# Patient Record
Sex: MALE | Race: BLACK OR AFRICAN AMERICAN | URBAN - METROPOLITAN AREA
[De-identification: names, ages, dates, MRNs, and addresses within clinical notes are randomized per-mention and may not be internally consistent; named-entity substitution may affect disease eponyms.]

---

## 2021-12-21 ENCOUNTER — LAB ENCOUNTER (OUTPATIENT)
Dept: LAB | Age: 41
End: 2021-12-21
Attending: FAMILY MEDICINE
Payer: COMMERCIAL

## 2021-12-21 ENCOUNTER — OFFICE VISIT (OUTPATIENT)
Dept: FAMILY MEDICINE CLINIC | Facility: CLINIC | Age: 41
End: 2021-12-21
Payer: COMMERCIAL

## 2021-12-21 VITALS
DIASTOLIC BLOOD PRESSURE: 88 MMHG | BODY MASS INDEX: 29.86 KG/M2 | HEIGHT: 75 IN | SYSTOLIC BLOOD PRESSURE: 122 MMHG | WEIGHT: 240.19 LBS | HEART RATE: 100 BPM

## 2021-12-21 DIAGNOSIS — Z00.00 ROUTINE PHYSICAL EXAMINATION: ICD-10-CM

## 2021-12-21 DIAGNOSIS — Z12.11 ENCOUNTER FOR COLONOSCOPY IN PATIENT WITH FAMILY HISTORY OF COLON POLYPS: Primary | ICD-10-CM

## 2021-12-21 DIAGNOSIS — Z76.89 ESTABLISHING CARE WITH NEW DOCTOR, ENCOUNTER FOR: ICD-10-CM

## 2021-12-21 DIAGNOSIS — Z83.71 ENCOUNTER FOR COLONOSCOPY IN PATIENT WITH FAMILY HISTORY OF COLON POLYPS: Primary | ICD-10-CM

## 2021-12-21 PROCEDURE — 3079F DIAST BP 80-89 MM HG: CPT | Performed by: FAMILY MEDICINE

## 2021-12-21 PROCEDURE — 93000 ELECTROCARDIOGRAM COMPLETE: CPT | Performed by: FAMILY MEDICINE

## 2021-12-21 PROCEDURE — 3008F BODY MASS INDEX DOCD: CPT | Performed by: FAMILY MEDICINE

## 2021-12-21 PROCEDURE — 3074F SYST BP LT 130 MM HG: CPT | Performed by: FAMILY MEDICINE

## 2021-12-21 PROCEDURE — 99386 PREV VISIT NEW AGE 40-64: CPT | Performed by: FAMILY MEDICINE

## 2021-12-21 RX ORDER — CYCLOBENZAPRINE HCL 10 MG
TABLET ORAL
COMMUNITY
Start: 2021-12-05

## 2021-12-21 RX ORDER — IBUPROFEN 800 MG/1
TABLET ORAL
COMMUNITY
Start: 2021-12-05

## 2021-12-21 NOTE — PATIENT INSTRUCTIONS
All adult screening ordered and done appropriate for patient's age and gender and risk factors and complaints. Encouraged physical fitness and daily physical activity daily. Monitor blood pressures and record at home. Limit salt intake.   Patient referred

## 2021-12-21 NOTE — PROGRESS NOTES
Subjective:   Patient ID: Jeffrey Valdivia is a 35year old male.     35year old AA male here for complete preventive care physical and for status update on any confirmed chronic medical illnesses and follow up on any previous labs or procedures that were sug normal.         Assessment & Plan:   1. Establishing care with new doctor, encounter for  Care established.     2. Routine physical examination  General well exam and the following have been ordered;  - CBC WITH DIFFERENTIAL WITH PLATELET  - COMP METABOLIC

## 2021-12-24 DIAGNOSIS — R17 ELEVATED BILIRUBIN: Primary | ICD-10-CM

## 2023-01-09 ENCOUNTER — LAB ENCOUNTER (OUTPATIENT)
Dept: LAB | Age: 43
End: 2023-01-09
Attending: FAMILY MEDICINE
Payer: COMMERCIAL

## 2023-01-09 ENCOUNTER — OFFICE VISIT (OUTPATIENT)
Dept: FAMILY MEDICINE CLINIC | Facility: CLINIC | Age: 43
End: 2023-01-09
Payer: COMMERCIAL

## 2023-01-09 VITALS
HEART RATE: 96 BPM | HEIGHT: 75 IN | WEIGHT: 248 LBS | DIASTOLIC BLOOD PRESSURE: 101 MMHG | TEMPERATURE: 97 F | SYSTOLIC BLOOD PRESSURE: 139 MMHG | BODY MASS INDEX: 30.84 KG/M2

## 2023-01-09 DIAGNOSIS — R73.09 ELEVATED RANDOM BLOOD GLUCOSE LEVEL: ICD-10-CM

## 2023-01-09 DIAGNOSIS — R17 ELEVATED BILIRUBIN: Primary | ICD-10-CM

## 2023-01-09 DIAGNOSIS — Z00.00 ENCOUNTER FOR SCREENING AND PREVENTATIVE CARE: ICD-10-CM

## 2023-01-09 PROCEDURE — 3008F BODY MASS INDEX DOCD: CPT | Performed by: FAMILY MEDICINE

## 2023-01-09 PROCEDURE — 3075F SYST BP GE 130 - 139MM HG: CPT | Performed by: FAMILY MEDICINE

## 2023-01-09 PROCEDURE — 3080F DIAST BP >= 90 MM HG: CPT | Performed by: FAMILY MEDICINE

## 2023-01-09 PROCEDURE — 99396 PREV VISIT EST AGE 40-64: CPT | Performed by: FAMILY MEDICINE

## 2023-01-09 NOTE — PATIENT INSTRUCTIONS
All adult screening ordered and done appropriate for patient's age and gender and risk factors and complaints. Monitor blood pressures and record at home. Limit salt intake. Encouraged physical fitness and daily physical activity daily. Recommend weight loss via daily exercising and consistent healthy dietary changes. Liver ultrasound ordered. Blood pressure does not measure to goal by criteria. We will await the lab results prior to the possibility of initiating blood pressure medication. Vitamin D3 at least 2000 IU gelcaps orally daily recommended. 1000 mg of vitamin C orally daily. Anywhere from 50 to 100 mg of zinc orally daily. We have recommended Culturelle probiotics for the patient to take regarding immune health and better gastrointestinal health and motility.

## 2023-01-11 DIAGNOSIS — R73.09 ELEVATED RANDOM BLOOD GLUCOSE LEVEL: Primary | ICD-10-CM

## 2023-01-13 LAB
ABSOLUTE BASOPHILS: 92 CELLS/UL (ref 0–200)
ABSOLUTE EOSINOPHILS: 348 CELLS/UL (ref 15–500)
ABSOLUTE LYMPHOCYTES: 1882 CELLS/UL (ref 850–3900)
ABSOLUTE MONOCYTES: 618 CELLS/UL (ref 200–950)
ABSOLUTE NEUTROPHILS: 4161 CELLS/UL (ref 1500–7800)
ALBUMIN/GLOBULIN RATIO: 1.6 (CALC) (ref 1–2.5)
ALBUMIN: 4.7 G/DL (ref 3.6–5.1)
ALKALINE PHOSPHATASE: 77 U/L (ref 36–130)
ALT: 21 U/L (ref 9–46)
APPEARANCE: CLEAR
AST: 24 U/L (ref 10–40)
BASOPHILS: 1.3 %
BILIRUBIN, TOTAL: 1.4 MG/DL (ref 0.2–1.2)
BILIRUBIN: NEGATIVE
BUN: 8 MG/DL (ref 7–25)
CALCIUM: 9.4 MG/DL (ref 8.6–10.3)
CARBON DIOXIDE: 23 MMOL/L (ref 20–32)
CHLORIDE: 101 MMOL/L (ref 98–110)
CHOL/HDLC RATIO: 2.8 (CALC)
CHOLESTEROL, TOTAL: 216 MG/DL
CREATININE: 0.95 MG/DL (ref 0.6–1.29)
EGFR: 101 ML/MIN/1.73M2
EOSINOPHILS: 4.9 %
GLOBULIN: 2.9 G/DL (CALC) (ref 1.9–3.7)
GLUCOSE: 107 MG/DL (ref 65–99)
GLUCOSE: NEGATIVE
HDL CHOLESTEROL: 77 MG/DL
HEMATOCRIT: 48.4 % (ref 38.5–50)
HEMOGLOBIN A1C: 5.8 % OF TOTAL HGB
HEMOGLOBIN: 16.2 G/DL (ref 13.2–17.1)
LDL-CHOLESTEROL: 118 MG/DL (CALC)
LEUKOCYTE ESTERASE: NEGATIVE
LYMPHOCYTES: 26.5 %
MCH: 29.1 PG (ref 27–33)
MCHC: 33.5 G/DL (ref 32–36)
MCV: 87.1 FL (ref 80–100)
MONOCYTES: 8.7 %
MPV: 8.6 FL (ref 7.5–12.5)
NEUTROPHILS: 58.6 %
NITRITE: NEGATIVE
NON-HDL CHOLESTEROL: 139 MG/DL (CALC)
OCCULT BLOOD: NEGATIVE
PH: 6 (ref 5–8)
PLATELET COUNT: 251 THOUSAND/UL (ref 140–400)
POTASSIUM: 4.1 MMOL/L (ref 3.5–5.3)
PROTEIN, TOTAL: 7.6 G/DL (ref 6.1–8.1)
RDW: 13.6 % (ref 11–15)
RED BLOOD CELL COUNT: 5.56 MILLION/UL (ref 4.2–5.8)
SODIUM: 137 MMOL/L (ref 135–146)
SPECIFIC GRAVITY: 1.02 (ref 1–1.03)
TOTAL PSA: 0.8 NG/ML
TRIGLYCERIDES: 107 MG/DL
TSH W/REFLEX TO FT4: 2.24 MIU/L (ref 0.4–4.5)
WHITE BLOOD CELL COUNT: 7.1 THOUSAND/UL (ref 3.8–10.8)

## 2023-01-16 RX ORDER — IBUPROFEN 800 MG/1
800 TABLET ORAL EVERY 8 HOURS PRN
Qty: 90 TABLET | Refills: 0 | Status: SHIPPED | OUTPATIENT
Start: 2023-01-16

## 2023-01-16 RX ORDER — CYCLOBENZAPRINE HCL 10 MG
10 TABLET ORAL NIGHTLY
Qty: 30 TABLET | Refills: 1 | Status: SHIPPED | OUTPATIENT
Start: 2023-01-16

## 2023-01-16 RX ORDER — CYCLOBENZAPRINE HCL 10 MG
10 TABLET ORAL NIGHTLY
Qty: 30 TABLET | Refills: 0 | Status: SHIPPED | OUTPATIENT
Start: 2023-01-16

## 2023-01-16 NOTE — TELEPHONE ENCOUNTER
Protocol failed or has No Protocol, please review  Both meds are listed as patient reported   Requested Prescriptions   Pending Prescriptions Disp Refills    cyclobenzaprine 10 MG Oral Tab  0       There is no refill protocol information for this order       ibuprofen 800 MG Oral Tab  0       Non-Narcotic Pain Medication Protocol Passed - 1/12/2023  7:50 PM        Passed - In person appointment or virtual visit in the past 6 mos or appointment in next 3 mos     Recent Outpatient Visits              1 week ago Elevated bilirubin    [address], [facility], [address], [place]    Office Visit    1 year ago Encounter for colonoscopy in patient with family history of colon [address] [facility], [address], [place]    Office Visit                           Recent Outpatient Visits              1 week ago Elevated bilirubin    [address] [facility], [address],     Office Visit    1 year ago Encounter for colonoscopy in patient with family history of colon [address] [facility], [address], [place]    Office Visit

## 2023-01-16 NOTE — TELEPHONE ENCOUNTER
/Protocol failed or has No Protocol, please review  Both meds are listed as patient reported     Last office visit 1/9/2023    Requested Prescriptions   Pending Prescriptions Disp Refills    cyclobenzaprine 10 MG Oral Tab  0       There is no refill protocol information for this order       ibuprofen 800 MG Oral Tab  0       Non-Narcotic Pain Medication Protocol Passed - 1/13/2023  2:22 PM        Passed - In person appointment or virtual visit in the past 6 mos or appointment in next 3 mos     Recent Outpatient Visits              1 week ago Elevated bilirubin    [address], [facility], [place], [place]    Office Visit    1 year ago Encounter for colonoscopy in patient with family history of colon [address] [facility], [place]    Office Visit                           Recent Outpatient Visits              1 week ago Elevated bilirubin    [address] [facility], [place]    Office Visit    1 year ago Encounter for colonoscopy in patient with family history of colon [address] [facility], [place]    Office Visit

## 2023-02-18 RX ORDER — IBUPROFEN 800 MG/1
TABLET ORAL
Qty: 90 TABLET | Refills: 0 | Status: SHIPPED | OUTPATIENT
Start: 2023-02-18

## 2023-04-17 RX ORDER — IBUPROFEN 800 MG/1
800 TABLET ORAL EVERY 8 HOURS PRN
Qty: 90 TABLET | Refills: 0 | Status: SHIPPED | OUTPATIENT
Start: 2023-04-17

## 2023-04-17 NOTE — TELEPHONE ENCOUNTER
Please review; protocol failed.  Or has no protocol    Requested Prescriptions   Pending Prescriptions Disp Refills    IBUPROFEN 800 MG Oral Tab [Pharmacy Med Name: IBUPROFEN 800 MG TABLET] 90 tablet 0     Sig: TAKE 1 TABLET BY MOUTH EVERY 8 HOURS AS NEEDED FOR PAIN       Non-Narcotic Pain Medication Protocol Passed - 4/16/2023  7:21 AM        Passed - In person appointment or virtual visit in the past 6 mos or appointment in next 3 mos     Recent Outpatient Visits              3 months ago Elevated bilirubin    [address], [address], [address] [place]    Office Visit    1 year ago Encounter for colonoscopy in patient with family history of colon [address] [facility], [address], [place]    Office Visit                            Recent Outpatient Visits              3 months ago Elevated bilirubin    [address] [facility], [address], [place]    Office Visit    1 year ago Encounter for colonoscopy in patient with family history of colon [address] [facility], [address], [place]    Office Visit

## 2023-05-22 RX ORDER — IBUPROFEN 800 MG/1
TABLET ORAL
Qty: 90 TABLET | Refills: 0 | Status: SHIPPED | OUTPATIENT
Start: 2023-05-22

## 2023-05-22 NOTE — TELEPHONE ENCOUNTER
Dr. Drea Chapin  - only 90 tablets given last time. Continue medication? Please advise, thanks    Refill passed per [place] [facility], [facility] protocol.     Requested Prescriptions   Pending Prescriptions Disp Refills    IBUPROFEN 800 MG Oral Tab [Pharmacy Med Name: IBUPROFEN 800 MG TABLET] 90 tablet 0     Sig: TAKE 1 TABLET BY MOUTH EVERY 8 HOURS AS NEEDED FOR PAIN       Non-Narcotic Pain Medication Protocol Passed - 5/22/2023  1:11 AM        Passed - In person appointment or virtual visit in the past 6 mos or appointment in next 3 mos     Recent Outpatient Visits              4 months ago Elevated bilirubin    [address], [address], [place]    Office Visit    1 year ago Encounter for colonoscopy in patient with family history of colon [address] [facility], [place]    Office Visit                         Recent Outpatient Visits              4 months ago Elevated bilirubin    [address] [facility], [place]     Office Visit    1 year ago Encounter for colonoscopy in patient with family history of colon [address] [facility], [place]    Office Visit

## 2025-02-19 ENCOUNTER — NURSE TRIAGE (OUTPATIENT)
Dept: FAMILY MEDICINE CLINIC | Facility: CLINIC | Age: 45
End: 2025-02-19

## 2025-02-19 NOTE — TELEPHONE ENCOUNTER
Action Requested: Summary for Provider     []  Critical Lab, Recommendations Needed  [] Need Additional Advice  []   FYI    []   Need Orders  [] Need Medications Sent to Pharmacy  []  Other     SUMMARY: Office visit    Future Appointments   Date Time Provider Department Center   2/25/2025 11:00 AM Weiler, Colleen M, DO [facility]   6/5/2025  8:20 AM Zurdo Renee, DO [facility]     Reason for call: Nail Distrophy  Onset: Data Unavailable    Patient's finger nails on his right hand are dark in color and having trouble sticking to the nail bed. He denies pain, redness, swelling or fever.     Reason for Disposition   Caller wants child seen for non-urgent problem    Protocols used: Fingernail Infection-P-[place]

## 2025-02-24 NOTE — TELEPHONE ENCOUNTER
Please review.      Future Appointments   Date Time Provider Department Center   2/25/2025 11:00 AM [facility], [facility] M[facility]      Cyclobenzaprine last written 1/16/2023  Ibuprofen last written 5/22/23    Requested Prescriptions   Pending Prescriptions Disp Refills    cyclobenzaprine 10 MG Oral Tab 30 tablet 0     Sig: Take 1 tablet (10 mg total) by mouth nightly.       There is no refill protocol information for this order       ibuprofen 800 MG Oral Tab 90 tablet 0     Sig: Take 1 tablet (800 mg total) by mouth every 8 (eight) hours as needed for Pain.       Non-Narcotic Pain Medication Protocol Passed - 2/24/2025  4:55 PM        Passed - In person appointment or virtual visit in the past 6 mos or appointment in next 3 mos        Passed - Medication is active on med list

## 2025-02-25 ENCOUNTER — OFFICE VISIT (OUTPATIENT)
Dept: FAMILY MEDICINE CLINIC | Facility: CLINIC | Age: 45
End: 2025-02-25
Payer: COMMERCIAL

## 2025-02-25 ENCOUNTER — LAB ENCOUNTER (OUTPATIENT)
Dept: LAB | Age: 45
End: 2025-02-25
Attending: FAMILY MEDICINE
Payer: COMMERCIAL

## 2025-02-25 VITALS
WEIGHT: 249 LBS | HEART RATE: 69 BPM | BODY MASS INDEX: 31 KG/M2 | SYSTOLIC BLOOD PRESSURE: 139 MMHG | DIASTOLIC BLOOD PRESSURE: 91 MMHG | RESPIRATION RATE: 16 BRPM

## 2025-02-25 DIAGNOSIS — I83.90 VARICOSE VEINS: ICD-10-CM

## 2025-02-25 DIAGNOSIS — Z00.00 ROUTINE PHYSICAL EXAMINATION: ICD-10-CM

## 2025-02-25 DIAGNOSIS — Z12.11 SCREENING FOR COLON CANCER: ICD-10-CM

## 2025-02-25 DIAGNOSIS — L60.9 NAIL ABNORMALITIES: Primary | ICD-10-CM

## 2025-02-25 DIAGNOSIS — R17 ELEVATED BILIRUBIN: ICD-10-CM

## 2025-02-25 LAB
ALBUMIN SERPL-MCNC: 4.4 G/DL (ref 3.2–4.8)
ALBUMIN/GLOB SERPL: 1.5 {RATIO} (ref 1–2)
ALP LIVER SERPL-CCNC: 68 U/L
ALT SERPL-CCNC: 22 U/L
ANION GAP SERPL CALC-SCNC: 8 MMOL/L (ref 0–18)
AST SERPL-CCNC: 26 U/L (ref ?–34)
BILIRUB SERPL-MCNC: 2.2 MG/DL (ref 0.3–1.2)
BUN BLD-MCNC: 13 MG/DL (ref 9–23)
BUN/CREAT SERPL: 11.7 (ref 10–20)
CALCIUM BLD-MCNC: 9.1 MG/DL (ref 8.7–10.4)
CHLORIDE SERPL-SCNC: 104 MMOL/L (ref 98–112)
CHOLEST SERPL-MCNC: 198 MG/DL (ref ?–200)
CO2 SERPL-SCNC: 28 MMOL/L (ref 21–32)
CREAT BLD-MCNC: 1.11 MG/DL
DEPRECATED RDW RBC AUTO: 47.9 FL (ref 35.1–46.3)
EGFRCR SERPLBLD CKD-EPI 2021: 83 ML/MIN/1.73M2 (ref 60–?)
ERYTHROCYTE [DISTWIDTH] IN BLOOD BY AUTOMATED COUNT: 14 % (ref 11–15)
EST. AVERAGE GLUCOSE BLD GHB EST-MCNC: 123 MG/DL (ref 68–126)
FASTING PATIENT LIPID ANSWER: YES
FASTING STATUS PATIENT QL REPORTED: YES
GLOBULIN PLAS-MCNC: 3 G/DL (ref 2–3.5)
GLUCOSE BLD-MCNC: 85 MG/DL (ref 70–99)
HBA1C MFR BLD: 5.9 % (ref ?–5.7)
HCT VFR BLD AUTO: 49.4 %
HDLC SERPL-MCNC: 70 MG/DL (ref 40–59)
HGB BLD-MCNC: 15.7 G/DL
LDLC SERPL CALC-MCNC: 113 MG/DL (ref ?–100)
MCH RBC QN AUTO: 29.4 PG (ref 26–34)
MCHC RBC AUTO-ENTMCNC: 31.8 G/DL (ref 31–37)
MCV RBC AUTO: 92.5 FL
NONHDLC SERPL-MCNC: 128 MG/DL (ref ?–130)
OSMOLALITY SERPL CALC.SUM OF ELEC: 289 MOSM/KG (ref 275–295)
PLATELET # BLD AUTO: 233 10(3)UL (ref 150–450)
POTASSIUM SERPL-SCNC: 4.2 MMOL/L (ref 3.5–5.1)
PROT SERPL-MCNC: 7.4 G/DL (ref 5.7–8.2)
RBC # BLD AUTO: 5.34 X10(6)UL
SODIUM SERPL-SCNC: 140 MMOL/L (ref 136–145)
TRIGL SERPL-MCNC: 83 MG/DL (ref 30–149)
TSI SER-ACNC: 0.69 UIU/ML (ref 0.55–4.78)
VLDLC SERPL CALC-MCNC: 14 MG/DL (ref 0–30)
WBC # BLD AUTO: 5.8 X10(3) UL (ref 4–11)

## 2025-02-25 PROCEDURE — 80053 COMPREHEN METABOLIC PANEL: CPT

## 2025-02-25 PROCEDURE — 84443 ASSAY THYROID STIM HORMONE: CPT

## 2025-02-25 PROCEDURE — 99214 OFFICE O/P EST MOD 30 MIN: CPT | Performed by: FAMILY MEDICINE

## 2025-02-25 PROCEDURE — 83036 HEMOGLOBIN GLYCOSYLATED A1C: CPT

## 2025-02-25 PROCEDURE — 85027 COMPLETE CBC AUTOMATED: CPT

## 2025-02-25 PROCEDURE — 3080F DIAST BP >= 90 MM HG: CPT | Performed by: FAMILY MEDICINE

## 2025-02-25 PROCEDURE — 3075F SYST BP GE 130 - 139MM HG: CPT | Performed by: FAMILY MEDICINE

## 2025-02-25 PROCEDURE — 36415 COLL VENOUS BLD VENIPUNCTURE: CPT

## 2025-02-25 PROCEDURE — 80061 LIPID PANEL: CPT

## 2025-02-25 RX ORDER — MULTIVIT-MIN/IRON/FOLIC ACID/K 18-600-40
CAPSULE ORAL
COMMUNITY
End: 2025-02-25

## 2025-02-25 RX ORDER — MULTIVIT WITH MINERALS/LUTEIN
250 TABLET ORAL DAILY
COMMUNITY
End: 2025-02-25

## 2025-02-25 NOTE — PROGRESS NOTES
HPI: Damaso is a 44 year old male who presents for nail changes.  Pt reports that he has cut nails on right hand and they grow back dark and crumbling. They are not attached to the nailbed and he breaks them off.  Left hand is fine.  Been there for over 2 years.     Has varicose veins int he back of his legs.  He does get numbness at times.  Tries to wear compression stockings while on feet.     PMH:    Past Medical History:    Asthma (HCC)    Essential hypertension    Borderline      Alg:  Patient has no known allergies.   Meds: Medications Ordered Prior to Encounter[1]   Tobacco Use: no    ROS: see HPI    Objective:   Gen: AOx3. NAD.   BP (!) 139/91   Pulse 69   Resp 16   Wt 249 lb (112.9 kg)   BMI 31.12 kg/m²   Right hand nails- crumbling, darkened nails noted to third and fourth nail  Right leg- distended veins noted to lower extremity    Assessment:/Plan:  Encounter Diagnoses   Name Primary?    Nail abnormalities    Suspect fungus.  Will refer to dermatology for evaluation   Yes    Varicose veins    Refer to Vascular surgery        Elevated bilirubin    Check labs.             Screening for colon cancer    Refer to GI.       Colleen Weiler, DO           [1]   Current Outpatient Medications on File Prior to Visit   Medication Sig Dispense Refill    Cholecalciferol (VITAMIN D) 50 MCG (2000 UT) Oral Cap Take by mouth.      ascorbic acid 250 MG Oral Tab Take 1 tablet (250 mg total) by mouth daily.       No current facility-administered medications on file prior to visit.

## 2025-02-26 RX ORDER — CYCLOBENZAPRINE HCL 10 MG
10 TABLET ORAL NIGHTLY
Qty: 30 TABLET | Refills: 0 | Status: SHIPPED | OUTPATIENT
Start: 2025-02-26

## 2025-02-26 RX ORDER — IBUPROFEN 800 MG/1
800 TABLET, FILM COATED ORAL EVERY 8 HOURS PRN
Qty: 90 TABLET | Refills: 0 | Status: SHIPPED | OUTPATIENT
Start: 2025-02-26

## 2025-02-28 NOTE — TELEPHONE ENCOUNTER
Please review; protocol failed/No Protocol      Last Office Visit: 02/25/2025    Medication is listed as patient reported.       No Vitamin D labs done

## 2025-03-01 RX ORDER — MULTIVIT-MIN/IRON/FOLIC ACID/K 18-600-40
1 CAPSULE ORAL DAILY
Qty: 90 CAPSULE | Refills: 0 | Status: SHIPPED | OUTPATIENT
Start: 2025-03-01

## 2025-03-01 RX ORDER — MULTIVIT WITH MINERALS/LUTEIN
250 TABLET ORAL DAILY
Qty: 90 TABLET | Refills: 0 | Status: SHIPPED | OUTPATIENT
Start: 2025-03-01

## 2025-03-14 DIAGNOSIS — R17 ELEVATED BILIRUBIN: Primary | ICD-10-CM
